# Patient Record
Sex: FEMALE | URBAN - METROPOLITAN AREA
[De-identification: names, ages, dates, MRNs, and addresses within clinical notes are randomized per-mention and may not be internally consistent; named-entity substitution may affect disease eponyms.]

---

## 2021-04-08 ENCOUNTER — APPOINTMENT (OUTPATIENT)
Age: 26
Setting detail: DERMATOLOGY
End: 2021-08-23

## 2021-04-08 VITALS — TEMPERATURE: 99.3 F

## 2021-04-08 DIAGNOSIS — L70.0 ACNE VULGARIS: ICD-10-CM

## 2021-04-08 PROCEDURE — OTHER ADDITIONAL NOTES: OTHER

## 2021-04-08 NOTE — PROCEDURE: ADDITIONAL NOTES
Additional Notes: Discussed jessner peel and skincare and sunscreen.  Purchased blemish busting pads, glycolic 10% cream and glytone hydration intensifying serum.
Detail Level: Simple
Render Risk Assessment In Note?: no